# Patient Record
Sex: MALE | Race: WHITE | NOT HISPANIC OR LATINO | Employment: FULL TIME | URBAN - METROPOLITAN AREA
[De-identification: names, ages, dates, MRNs, and addresses within clinical notes are randomized per-mention and may not be internally consistent; named-entity substitution may affect disease eponyms.]

---

## 2023-04-17 ENCOUNTER — PREP FOR SURGERY (OUTPATIENT)
Dept: OTHER | Facility: HOSPITAL | Age: 48
End: 2023-04-17
Payer: COMMERCIAL

## 2023-04-17 ENCOUNTER — APPOINTMENT (OUTPATIENT)
Dept: CT IMAGING | Facility: HOSPITAL | Age: 48
End: 2023-04-17
Payer: COMMERCIAL

## 2023-04-17 ENCOUNTER — HOSPITAL ENCOUNTER (EMERGENCY)
Facility: HOSPITAL | Age: 48
Discharge: HOME | End: 2023-04-17
Attending: EMERGENCY MEDICINE | Admitting: EMERGENCY MEDICINE
Payer: COMMERCIAL

## 2023-04-17 VITALS
HEART RATE: 61 BPM | OXYGEN SATURATION: 97 % | BODY MASS INDEX: 27.31 KG/M2 | DIASTOLIC BLOOD PRESSURE: 78 MMHG | WEIGHT: 174 LBS | TEMPERATURE: 97.6 F | SYSTOLIC BLOOD PRESSURE: 141 MMHG | RESPIRATION RATE: 16 BRPM | HEIGHT: 67 IN

## 2023-04-17 DIAGNOSIS — K42.9 UMBILICAL HERNIA WITHOUT OBSTRUCTION AND WITHOUT GANGRENE: Primary | ICD-10-CM

## 2023-04-17 LAB
ALBUMIN SERPL-MCNC: 4.6 G/DL (ref 3.5–5.2)
ALBUMIN/GLOB SERPL: 1.8 G/DL
ALP SERPL-CCNC: 62 U/L (ref 39–117)
ALT SERPL W P-5'-P-CCNC: 21 U/L (ref 1–41)
ANION GAP SERPL CALCULATED.3IONS-SCNC: 9.9 MMOL/L (ref 5–15)
AST SERPL-CCNC: 19 U/L (ref 1–40)
BASOPHILS # BLD AUTO: 0.07 10*3/MM3 (ref 0–0.2)
BASOPHILS NFR BLD AUTO: 1 % (ref 0–1.5)
BILIRUB SERPL-MCNC: 0.4 MG/DL (ref 0–1.2)
BILIRUB UR QL STRIP: NEGATIVE
BUN SERPL-MCNC: 15 MG/DL (ref 6–20)
BUN/CREAT SERPL: 16.1 (ref 7–25)
CALCIUM SPEC-SCNC: 9.5 MG/DL (ref 8.6–10.5)
CHLORIDE SERPL-SCNC: 103 MMOL/L (ref 98–107)
CLARITY UR: ABNORMAL
CO2 SERPL-SCNC: 27.1 MMOL/L (ref 22–29)
COLOR UR: YELLOW
CREAT SERPL-MCNC: 0.93 MG/DL (ref 0.76–1.27)
DEPRECATED RDW RBC AUTO: 40.2 FL (ref 37–54)
EGFRCR SERPLBLD CKD-EPI 2021: 101.3 ML/MIN/1.73
EOSINOPHIL # BLD AUTO: 0.25 10*3/MM3 (ref 0–0.4)
EOSINOPHIL NFR BLD AUTO: 3.5 % (ref 0.3–6.2)
ERYTHROCYTE [DISTWIDTH] IN BLOOD BY AUTOMATED COUNT: 12.7 % (ref 12.3–15.4)
GLOBULIN UR ELPH-MCNC: 2.6 GM/DL
GLUCOSE SERPL-MCNC: 116 MG/DL (ref 65–99)
GLUCOSE UR STRIP-MCNC: NEGATIVE MG/DL
HCT VFR BLD AUTO: 44.9 % (ref 37.5–51)
HGB BLD-MCNC: 15.1 G/DL (ref 13–17.7)
HGB UR QL STRIP.AUTO: NEGATIVE
IMM GRANULOCYTES # BLD AUTO: 0.02 10*3/MM3 (ref 0–0.05)
IMM GRANULOCYTES NFR BLD AUTO: 0.3 % (ref 0–0.5)
KETONES UR QL STRIP: NEGATIVE
LEUKOCYTE ESTERASE UR QL STRIP.AUTO: NEGATIVE
LYMPHOCYTES # BLD AUTO: 2.12 10*3/MM3 (ref 0.7–3.1)
LYMPHOCYTES NFR BLD AUTO: 29.5 % (ref 19.6–45.3)
MCH RBC QN AUTO: 29.3 PG (ref 26.6–33)
MCHC RBC AUTO-ENTMCNC: 33.6 G/DL (ref 31.5–35.7)
MCV RBC AUTO: 87.2 FL (ref 79–97)
MONOCYTES # BLD AUTO: 0.55 10*3/MM3 (ref 0.1–0.9)
MONOCYTES NFR BLD AUTO: 7.6 % (ref 5–12)
NEUTROPHILS NFR BLD AUTO: 4.18 10*3/MM3 (ref 1.7–7)
NEUTROPHILS NFR BLD AUTO: 58.1 % (ref 42.7–76)
NITRITE UR QL STRIP: NEGATIVE
NRBC BLD AUTO-RTO: 0 /100 WBC (ref 0–0.2)
PH UR STRIP.AUTO: 7.5 [PH] (ref 4.5–8)
PLATELET # BLD AUTO: 263 10*3/MM3 (ref 140–450)
PMV BLD AUTO: 10.5 FL (ref 6–12)
POTASSIUM SERPL-SCNC: 3.5 MMOL/L (ref 3.5–5.2)
PROT SERPL-MCNC: 7.2 G/DL (ref 6–8.5)
PROT UR QL STRIP: NEGATIVE
RBC # BLD AUTO: 5.15 10*6/MM3 (ref 4.14–5.8)
SODIUM SERPL-SCNC: 140 MMOL/L (ref 136–145)
SP GR UR STRIP: 1.02 (ref 1–1.03)
UROBILINOGEN UR QL STRIP: ABNORMAL
WBC NRBC COR # BLD: 7.19 10*3/MM3 (ref 3.4–10.8)

## 2023-04-17 PROCEDURE — 80053 COMPREHEN METABOLIC PANEL: CPT | Performed by: EMERGENCY MEDICINE

## 2023-04-17 PROCEDURE — 25510000001 IOPAMIDOL PER 1 ML: Performed by: EMERGENCY MEDICINE

## 2023-04-17 PROCEDURE — 81003 URINALYSIS AUTO W/O SCOPE: CPT | Performed by: EMERGENCY MEDICINE

## 2023-04-17 PROCEDURE — 74177 CT ABD & PELVIS W/CONTRAST: CPT

## 2023-04-17 PROCEDURE — 85025 COMPLETE CBC W/AUTO DIFF WBC: CPT | Performed by: EMERGENCY MEDICINE

## 2023-04-17 PROCEDURE — 99283 EMERGENCY DEPT VISIT LOW MDM: CPT

## 2023-04-17 RX ORDER — CEFAZOLIN SODIUM 2 G/50ML
2 SOLUTION INTRAVENOUS ONCE
OUTPATIENT
Start: 2023-04-17 | End: 2023-04-17

## 2023-04-17 RX ORDER — SODIUM CHLORIDE 0.9 % (FLUSH) 0.9 %
10 SYRINGE (ML) INJECTION AS NEEDED
Status: DISCONTINUED | OUTPATIENT
Start: 2023-04-17 | End: 2023-04-17 | Stop reason: HOSPADM

## 2023-04-17 RX ORDER — HYDROCODONE BITARTRATE AND ACETAMINOPHEN 5; 325 MG/1; MG/1
1 TABLET ORAL EVERY 6 HOURS PRN
Qty: 10 TABLET | Refills: 0 | Status: SHIPPED | OUTPATIENT
Start: 2023-04-17

## 2023-04-17 RX ORDER — ONDANSETRON 4 MG/1
4 TABLET, ORALLY DISINTEGRATING ORAL EVERY 6 HOURS PRN
Qty: 10 TABLET | Refills: 0 | Status: SHIPPED | OUTPATIENT
Start: 2023-04-17

## 2023-04-17 RX ADMIN — IOPAMIDOL 100 ML: 755 INJECTION, SOLUTION INTRAVENOUS at 18:29

## 2023-04-17 NOTE — ED PROVIDER NOTES
"Subjective   History of Present Illness  Ward Penn is a 47-year-old white male who present secondary to abdominal pain.  Patient had onset of pain in his lower mid abdomen approximate hour prior to ER arrival.  Patient states he had lifted a couple of pieces of plywood shortly before onset of pain.  He describes the discomfort only as \"pain\".  It waxes and wanes a bit.  At its most intense patient states he wants to pace around.  The pain radiates inferiorly to the suprapubic region.  Associated nausea but no vomiting.  No fever or chills.  No diarrhea or constipation.  Patient thinks this could possibly be a kidney stone.  However he also has an umbilical hernia.  He is concerned that symptoms could be related to this issue as well.  Patient presents for evaluation.    History provided by:  Patient      Review of Systems   Constitutional: Negative for fever.   HENT: Negative for rhinorrhea.    Eyes: Negative for redness.   Respiratory: Negative for cough.    Cardiovascular: Negative for chest pain.   Gastrointestinal: Positive for abdominal pain and nausea.   Genitourinary: Negative for dysuria.   Musculoskeletal: Negative for back pain.   Skin: Negative for rash.   Neurological: Negative for syncope.   All other systems reviewed and are negative.      Past Medical History:   Diagnosis Date   • GERD (gastroesophageal reflux disease)    • Hyperlipidemia    • Hypertension        Allergies   Allergen Reactions   • Sulfa Antibiotics Other (See Comments)     Redness and burning to eyes       History reviewed. No pertinent surgical history.    History reviewed. No pertinent family history.    Social History     Socioeconomic History   • Marital status:    Tobacco Use   • Smoking status: Never           Objective   Physical Exam  Vitals and nursing note reviewed.   Constitutional:       General: He is not in acute distress.     Appearance: Normal appearance. He is well-developed. He is not ill-appearing, " toxic-appearing or diaphoretic.      Comments: 47-year-old white male lying in bed.  Patient is a bit overweight.  He otherwise appears in good overall health.  Vital signs notable for BP of 196/102.  Otherwise unremarkable.  Patient friendly and cooperative.   HENT:      Head: Normocephalic and atraumatic.      Right Ear: Tympanic membrane, ear canal and external ear normal.      Left Ear: Tympanic membrane, ear canal and external ear normal.      Nose: Nose normal.      Mouth/Throat:      Mouth: Mucous membranes are moist.      Pharynx: Oropharynx is clear.   Eyes:      Extraocular Movements: Extraocular movements intact.      Conjunctiva/sclera: Conjunctivae normal.      Pupils: Pupils are equal, round, and reactive to light.   Cardiovascular:      Rate and Rhythm: Normal rate and regular rhythm.      Heart sounds: Normal heart sounds. No murmur heard.    No friction rub. No gallop.   Pulmonary:      Effort: Pulmonary effort is normal. No respiratory distress.      Breath sounds: Normal breath sounds. No stridor. No wheezing or rales.   Abdominal:      General: Bowel sounds are decreased. There is no distension.      Palpations: Abdomen is soft. There is no hepatomegaly, splenomegaly, mass or pulsatile mass.      Tenderness: There is abdominal tenderness in the periumbilical area and suprapubic area. There is no right CVA tenderness, left CVA tenderness, guarding or rebound. Negative signs include Wang's sign, Rovsing's sign and McBurney's sign.      Hernia: A hernia is present. Hernia is present in the umbilical area.   Musculoskeletal:         General: Normal range of motion.      Cervical back: Normal range of motion and neck supple.   Skin:     General: Skin is warm and dry.      Findings: No erythema or rash.   Neurological:      General: No focal deficit present.      Mental Status: He is alert and oriented to person, place, and time.      Cranial Nerves: No cranial nerve deficit.      Deep Tendon  Reflexes: Reflexes are normal and symmetric.   Psychiatric:         Mood and Affect: Mood normal.         Behavior: Behavior normal.         Procedures           ED Course  ED Course as of 04/17/23 1903   Mon Apr 17, 2023   1538 Patient tender at umbilicus and suprapubic regions.  Umbilical hernia present.  I do not palpate a incarceration.  However pain began after lifting.  I feel lab work and CT with IV and oral contrast indicated for further evaluation.  Patient declined offer for pain and nausea medicine at this time. [SS]   1645 CBC unremarkable.  CMP shows blood sugar 116.  Otherwise unremarkable.  UA shows slightly cloudy urine.  Otherwise unremarkable.  No blood present.  Leukocyte nitrite negative.  Awaiting CT. [SS]   1707 Patient resting comfortably in bed.  Discussed results thus far.  Contrast drank.  Awaiting CT [SS]   1855 CT shows herniated fat with evidence of inflammation.  No bowel herniation.  This is the source of patient's pain.  Have discussed this patient with Dr. Jaime.  Her office will call tomorrow and schedule an appointment for no later than Wednesday.  Dr. Jaime plans on performing surgery on Thursday.  I discussed at length with patient and his spouse all results, diagnoses, plan for follow-up with surgery and hernia repair later this week.  Also discussed indications return to the ED.  Patient's wife works at [employer].  They may follow-up with a [facility] physician.  Thus burning a copy of CT on a disc.  Will prescribe pain and nausea medicine for home.  Will DC home.    Prescription  1-hydrocodone/acetaminophen  2-Zofran [SS]      ED Course User Index  [SS] Rupert Moreno MD      Labs Reviewed   COMPREHENSIVE METABOLIC PANEL - Abnormal; Notable for the following components:       Result Value    Glucose 116 (*)     All other components within normal limits    Narrative:     GFR Normal >60  Chronic Kidney Disease <60  Kidney Failure <15     URINALYSIS W/ MICROSCOPIC IF  INDICATED (NO CULTURE) - Abnormal; Notable for the following components:    Appearance, UA Slightly Cloudy (*)     All other components within normal limits    Narrative:     Urine microscopic not indicated.   CBC WITH AUTO DIFFERENTIAL - Normal   CBC AND DIFFERENTIAL    Narrative:     The following orders were created for panel order CBC & Differential.  Procedure                               Abnormality         Status                     ---------                               -----------         ------                     CBC Auto Differential[[id number]]         Normal              Final result                 Please view results for these tests on the individual orders.     CT Abdomen Pelvis With Contrast    Result Date: 4/17/2023  Narrative: INDICATION: Umbilical pain TECHNIQUE: Routine CT abdomen and pelvis following administration of IV contrast, with coronal and sagittal reformats. Coronal and sagittal reformats. Radiation dose reduction techniques included automated exposure control or exposure modulation based on body size. Count of known CT and cardiac nuc med studies performed in previous 12 months: 0. COMPARISON: None available. FINDINGS: LOWER THORAX: Small amount of subsegmental atelectasis at the bases. LIVER: Normal. GALLBLADDER AND BILE DUCTS: Unremarkable. No visualized stones or biliary ductal dilatation. SPLEEN: Normal. PANCREAS: Normal. ADRENAL GLANDS: Normal. KIDNEYS AND URETERS: Fluid attenuating cyst in the superior pole left kidney measures 2.4 cm. No solid-appearing renal mass. No hydronephrosis. Nonobstructing nephrolithiasis in the superior pole right kidney, series 2, axial image 27, measures 1 mm. PELVIC ORGANS: Prostate is normal in size. Normal bladder.   PERITONEAL CAVITY/GASTROINTESTINAL TRACT:  Mild wall thickening in the sigmoid colon, suspect related to under distention, without surrounding inflammation seen. No obstruction. Normal appendix.  No free fluid or pneumoperitoneum.  BODY WALL: Rectus diastases. Fat-containing umbilical hernia with the hernia neck, series 2, axial image 53, measuring 1.4 cm, with some stranding in the hernia. No bowel enters this hernia. RETROPERITONEUM: Normal. VASCULAR: Unremarkable. BONES: No significant abnormality.  No lytic or blastic lesions seen.     Impression: Fat-containing umbilical hernia with some stranding/inflammation seen in the herniated fat. No bowel enters the hernia Signer Name: Mario Lake MD  Signed: 4/17/2023 6:39 PM  Workstation Name: [id number]  [facility]    XR elbow 1 or 2 views right    Result Date: 4/13/2023  Narrative: INDICATION:  Fall yesterday.and hit right elbow. Pain and swelling . TECHNIQUE:  Three views of the right elbow. COMPARISON:  None Available. FINDINGS:   No discrete fracture. No focal soft tissue abnormality. Calcification triceps tendon. No abnormal radiopaque foreign body. Impression: No acute bony abnormality demonstrated by this technique. Signed by Milton Garduno D.O.    My differential diagnosis for abdominal pain includes but is not limited to:  Gastritis, gastroenteritis, peptic ulcer disease, GERD, esophageal perforation, acute appendicitis, mesenteric adenitis, Meckel’s diverticulum, epiploic appendagitis, diverticulitis, colon cancer, ulcerative colitis, Crohn’s disease, intussusception, small bowel obstruction, adhesions, ischemic bowel, perforated viscus, ileus, obstipation, biliary colic, cholecystitis, cholelithiasis, Obey-Flakito Guich[place], hepatitis, pancreatitis, common bile duct obstruction, cholangitis, bile leak, splenic trauma, splenic rupture, splenic infarction, splenic abscess, abdominal wall hematoma, abdominal wall abscess, intra-abdominal abscess, ascites, spontaneous bacterial peritonitis, hernia, UTI, cystitis, prostatitis, ureterolithiasis, urinary obstruction, AAA, myocardial infarction, pneumonia, cancer, porphyria, DKA, medications, sickle cell, viral  syndrome, zoster                                       MDM    Final diagnoses:   Umbilical hernia without obstruction and without gangrene       ED Disposition  ED Disposition     ED Disposition   Discharge    Condition   Stable    Comment   --             Elaine Jaime DO  [address]  [address]  [facility] [address] [phone]      Dr. Pond's office will call you in the morning.  If you have not heard from the office by 11:00 call.  Inform office staff of ER visit, presence of fat-containing umbilical hernia and plan for follow-up with Dr. Jaime on Wednesday with possible surgery on Thursday., Sooner if needed         Medication List      New Prescriptions    HYDROcodone-acetaminophen 5-325 MG per tablet  Commonly known as: NORCO  Take 1 tablet by mouth Every 6 (Six) Hours As Needed for Moderate Pain or Severe Pain (2 tabs if needed) for up to 10 doses.     ondansetron ODT 4 MG disintegrating tablet  Commonly known as: ZOFRAN-ODT  Place 1 tablet on the tongue Every 6 (Six) Hours As Needed for Nausea or Vomiting for up to 10 doses.           Where to Get Your Medications      These medications were sent to [id number] - [place], [place] [address] [address] [facility] [address] - [phone]  - [phone] [facility] [address] [facility] [place] [address]    Phone: [phone]   · HYDROcodone-acetaminophen 5-325 MG per tablet  · ondansetron ODT 4 MG disintegrating tablet          Rupert Moreno MD  04/17/23 1270

## 2023-04-17 NOTE — DISCHARGE INSTRUCTIONS
Medication as directed.  Follow-up with Dr. Jaime or a surgeon of your choice as above.  Return to ED for severe pain, if the overlying skin becomes discolored, you develop fevers, chills, other signs of systemic infection, signs of bowel obstruction, worsening symptoms, medical emergencies.

## 2023-04-17 NOTE — Clinical Note
[place]  [facility] EMERGENCY DEPARTMENT  [address]  [place] [address]  Phone: [phone]    Ward Penn was seen and treated in our emergency department on 4/17/2023.  He may return to work on 04/20/2023.  When released by surgeon       Thank you for choosing [facility].    Rupert Moreno MD

## 2023-04-18 ENCOUNTER — TELEPHONE (OUTPATIENT)
Dept: SURGERY | Facility: CLINIC | Age: 48
End: 2023-04-18
Payer: COMMERCIAL

## 2023-04-18 PROBLEM — K42.9 UMBILICAL HERNIA WITHOUT OBSTRUCTION AND WITHOUT GANGRENE: Status: ACTIVE | Noted: 2023-04-18

## 2023-04-18 NOTE — TELEPHONE ENCOUNTER
Per the direction of Dr. Jaime, phone call to pt to discuss OV tomorrow and UHR Thursday.  Pt states his GF works at [employer] and she is scheduling him with a surgeon at that location.    SG SCHED notified.